# Patient Record
Sex: MALE | Race: WHITE | NOT HISPANIC OR LATINO
[De-identification: names, ages, dates, MRNs, and addresses within clinical notes are randomized per-mention and may not be internally consistent; named-entity substitution may affect disease eponyms.]

---

## 2017-05-05 ENCOUNTER — NON-APPOINTMENT (OUTPATIENT)
Age: 62
End: 2017-05-05

## 2017-05-05 ENCOUNTER — APPOINTMENT (OUTPATIENT)
Dept: CARDIOLOGY | Facility: CLINIC | Age: 62
End: 2017-05-05

## 2017-05-05 VITALS
BODY MASS INDEX: 31.08 KG/M2 | HEIGHT: 71 IN | OXYGEN SATURATION: 98 % | DIASTOLIC BLOOD PRESSURE: 70 MMHG | SYSTOLIC BLOOD PRESSURE: 120 MMHG | HEART RATE: 88 BPM | WEIGHT: 222 LBS

## 2017-05-11 ENCOUNTER — RX RENEWAL (OUTPATIENT)
Age: 62
End: 2017-05-11

## 2017-08-07 ENCOUNTER — RX RENEWAL (OUTPATIENT)
Age: 62
End: 2017-08-07

## 2017-08-10 ENCOUNTER — MEDICATION RENEWAL (OUTPATIENT)
Age: 62
End: 2017-08-10

## 2017-08-22 ENCOUNTER — RX RENEWAL (OUTPATIENT)
Age: 62
End: 2017-08-22

## 2017-08-29 ENCOUNTER — RESULT REVIEW (OUTPATIENT)
Age: 62
End: 2017-08-29

## 2017-09-05 ENCOUNTER — RX RENEWAL (OUTPATIENT)
Age: 62
End: 2017-09-05

## 2017-11-14 ENCOUNTER — NON-APPOINTMENT (OUTPATIENT)
Age: 62
End: 2017-11-14

## 2017-11-14 ENCOUNTER — APPOINTMENT (OUTPATIENT)
Dept: CARDIOLOGY | Facility: CLINIC | Age: 62
End: 2017-11-14
Payer: MEDICARE

## 2017-11-14 VITALS
WEIGHT: 229 LBS | DIASTOLIC BLOOD PRESSURE: 70 MMHG | HEART RATE: 62 BPM | SYSTOLIC BLOOD PRESSURE: 122 MMHG | OXYGEN SATURATION: 95 % | BODY MASS INDEX: 31.94 KG/M2

## 2017-11-14 DIAGNOSIS — R60.0 LOCALIZED EDEMA: ICD-10-CM

## 2017-11-14 DIAGNOSIS — M25.562 PAIN IN RIGHT KNEE: ICD-10-CM

## 2017-11-14 DIAGNOSIS — Z23 ENCOUNTER FOR IMMUNIZATION: ICD-10-CM

## 2017-11-14 DIAGNOSIS — M25.561 PAIN IN RIGHT KNEE: ICD-10-CM

## 2017-11-14 PROCEDURE — 93000 ELECTROCARDIOGRAM COMPLETE: CPT

## 2017-11-14 PROCEDURE — 99215 OFFICE O/P EST HI 40 MIN: CPT

## 2017-11-14 RX ORDER — LEVOFLOXACIN 500 MG/1
500 TABLET, FILM COATED ORAL
Qty: 7 | Refills: 0 | Status: COMPLETED | COMMUNITY
Start: 2017-04-03

## 2017-11-14 RX ORDER — AZITHROMYCIN 250 MG/1
250 TABLET, FILM COATED ORAL
Qty: 6 | Refills: 0 | Status: COMPLETED | COMMUNITY
Start: 2017-04-27

## 2017-12-08 ENCOUNTER — APPOINTMENT (OUTPATIENT)
Dept: CARDIOLOGY | Facility: CLINIC | Age: 62
End: 2017-12-08
Payer: MEDICARE

## 2017-12-08 PROCEDURE — 93306 TTE W/DOPPLER COMPLETE: CPT

## 2017-12-14 ENCOUNTER — RESULT REVIEW (OUTPATIENT)
Age: 62
End: 2017-12-14

## 2018-02-05 ENCOUNTER — RX RENEWAL (OUTPATIENT)
Age: 63
End: 2018-02-05

## 2018-02-14 ENCOUNTER — RX RENEWAL (OUTPATIENT)
Age: 63
End: 2018-02-14

## 2018-04-18 ENCOUNTER — MEDICATION RENEWAL (OUTPATIENT)
Age: 63
End: 2018-04-18

## 2018-05-03 ENCOUNTER — RX RENEWAL (OUTPATIENT)
Age: 63
End: 2018-05-03

## 2018-05-07 ENCOUNTER — RX RENEWAL (OUTPATIENT)
Age: 63
End: 2018-05-07

## 2018-05-11 ENCOUNTER — NON-APPOINTMENT (OUTPATIENT)
Age: 63
End: 2018-05-11

## 2018-05-11 ENCOUNTER — APPOINTMENT (OUTPATIENT)
Dept: CARDIOLOGY | Facility: CLINIC | Age: 63
End: 2018-05-11
Payer: MEDICARE

## 2018-05-11 VITALS
SYSTOLIC BLOOD PRESSURE: 125 MMHG | OXYGEN SATURATION: 98 % | DIASTOLIC BLOOD PRESSURE: 80 MMHG | BODY MASS INDEX: 31.5 KG/M2 | HEIGHT: 71 IN | HEART RATE: 81 BPM | WEIGHT: 225 LBS

## 2018-05-11 DIAGNOSIS — M54.41 LUMBAGO WITH SCIATICA, RIGHT SIDE: ICD-10-CM

## 2018-05-11 PROCEDURE — 99215 OFFICE O/P EST HI 40 MIN: CPT

## 2018-05-11 PROCEDURE — 93000 ELECTROCARDIOGRAM COMPLETE: CPT

## 2018-05-24 ENCOUNTER — RX RENEWAL (OUTPATIENT)
Age: 63
End: 2018-05-24

## 2018-05-31 ENCOUNTER — RX RENEWAL (OUTPATIENT)
Age: 63
End: 2018-05-31

## 2018-06-04 ENCOUNTER — RX RENEWAL (OUTPATIENT)
Age: 63
End: 2018-06-04

## 2018-06-11 ENCOUNTER — RX RENEWAL (OUTPATIENT)
Age: 63
End: 2018-06-11

## 2018-09-17 ENCOUNTER — RX RENEWAL (OUTPATIENT)
Age: 63
End: 2018-09-17

## 2018-10-25 ENCOUNTER — RX RENEWAL (OUTPATIENT)
Age: 63
End: 2018-10-25

## 2018-12-07 ENCOUNTER — APPOINTMENT (OUTPATIENT)
Dept: CARDIOLOGY | Facility: CLINIC | Age: 63
End: 2018-12-07
Payer: MEDICARE

## 2018-12-07 VITALS
HEART RATE: 66 BPM | OXYGEN SATURATION: 98 % | HEIGHT: 71 IN | WEIGHT: 229 LBS | DIASTOLIC BLOOD PRESSURE: 70 MMHG | BODY MASS INDEX: 32.06 KG/M2 | SYSTOLIC BLOOD PRESSURE: 158 MMHG

## 2018-12-07 DIAGNOSIS — R06.09 OTHER FORMS OF DYSPNEA: ICD-10-CM

## 2018-12-07 PROCEDURE — 99215 OFFICE O/P EST HI 40 MIN: CPT

## 2018-12-07 PROCEDURE — 93000 ELECTROCARDIOGRAM COMPLETE: CPT

## 2018-12-07 PROCEDURE — 93306 TTE W/DOPPLER COMPLETE: CPT

## 2018-12-07 RX ORDER — PREDNISONE 20 MG/1
20 TABLET ORAL
Qty: 28 | Refills: 0 | Status: DISCONTINUED | COMMUNITY
Start: 2018-05-08 | End: 2018-12-07

## 2018-12-11 ENCOUNTER — NON-APPOINTMENT (OUTPATIENT)
Age: 63
End: 2018-12-11

## 2018-12-11 NOTE — REVIEW OF SYSTEMS
[see HPI] : see HPI [Cough] : cough [Wheezing] : wheezing [Coughing Up Blood] : no hemoptysis [Wheezing Worse w/ Exercise] : wheezing worsens with exercise [Wheezing Worse During Cold Weather] : wheezing worse during cold weather [Negative] : Heme/Lymph

## 2018-12-11 NOTE — PHYSICAL EXAM
[General Appearance - Well Developed] : well developed [Normal Appearance] : normal appearance [Well Groomed] : well groomed [General Appearance - Well Nourished] : well nourished [No Deformities] : no deformities [General Appearance - In No Acute Distress] : no acute distress [Normal Conjunctiva] : the conjunctiva exhibited no abnormalities [Eyelids - No Xanthelasma] : the eyelids demonstrated no xanthelasmas [Normal Oral Mucosa] : normal oral mucosa [No Oral Pallor] : no oral pallor [No Oral Cyanosis] : no oral cyanosis [Normal Jugular Venous A Waves Present] : normal jugular venous A waves present [Normal Jugular Venous V Waves Present] : normal jugular venous V waves present [No Jugular Venous Albrecht A Waves] : no jugular venous albrecht A waves [Abdomen Soft] : soft [Abdomen Tenderness] : non-tender [Abdomen Mass (___ Cm)] : no abdominal mass palpated [Abnormal Walk] : normal gait [Gait - Sufficient For Exercise Testing] : the gait was sufficient for exercise testing [Nail Clubbing] : no clubbing of the fingernails [Cyanosis, Localized] : no localized cyanosis [Petechial Hemorrhages (___cm)] : no petechial hemorrhages [Skin Color & Pigmentation] : normal skin color and pigmentation [] : no rash [No Venous Stasis] : no venous stasis [Skin Lesions] : no skin lesions [No Skin Ulcers] : no skin ulcer [No Xanthoma] : no  xanthoma was observed [Oriented To Time, Place, And Person] : oriented to person, place, and time [Affect] : the affect was normal [Mood] : the mood was normal [No Anxiety] : not feeling anxious [5th Left ICS - MCL] : palpated at the 5th LICS in the midclavicular line [Not Palpable] : not palpable [No Precordial Heave] : no precordial heave was noted [Rhythm Regular] : regular [Normal S1] : abnormal S1 [Normal S2] : normal S2 [No Murmur] : no murmurs heard [Right Carotid Bruit] : no bruit heard over the right carotid [Right Femoral Bruit] : no bruit heard over the right femoral artery [2+] : left 2+ [___ +] : bilateral [unfilled]U+ pretibial pitting edema [Rt] : no varicose veins of the right leg [Lt] : no varicose veins of the left leg [Normal Rate] : the respiratory rate was normal [Acc Muscles Use] : accessory muscle use was noted [Distress] : no respiratory distress [Dry Cough] : a dry cough was heard [Audible Wheezing] : audible wheezing was heard [Scattered Wheezes] : scattered wheezing was heard [Prolonged Exp Time] : expiratory time was prolonged [Normal to Percussion] : the lungs were normal to percussion

## 2018-12-11 NOTE — CARDIOLOGY SUMMARY
[No Ischemia] : no Ischemia [No Exercise Ind Arr] : no exercise induced arrhythmias [No Symptoms] : no Symptoms [___] : [unfilled]

## 2018-12-24 ENCOUNTER — RX RENEWAL (OUTPATIENT)
Age: 63
End: 2018-12-24

## 2019-01-14 ENCOUNTER — RESULT REVIEW (OUTPATIENT)
Age: 64
End: 2019-01-14

## 2019-01-21 ENCOUNTER — RX RENEWAL (OUTPATIENT)
Age: 64
End: 2019-01-21

## 2019-03-04 ENCOUNTER — RX RENEWAL (OUTPATIENT)
Age: 64
End: 2019-03-04

## 2019-03-11 ENCOUNTER — RX RENEWAL (OUTPATIENT)
Age: 64
End: 2019-03-11

## 2019-03-12 ENCOUNTER — MEDICATION RENEWAL (OUTPATIENT)
Age: 64
End: 2019-03-12

## 2019-03-26 ENCOUNTER — APPOINTMENT (OUTPATIENT)
Dept: CARDIOLOGY | Facility: CLINIC | Age: 64
End: 2019-03-26
Payer: MEDICARE

## 2019-03-26 ENCOUNTER — NON-APPOINTMENT (OUTPATIENT)
Age: 64
End: 2019-03-26

## 2019-03-26 VITALS
BODY MASS INDEX: 32.2 KG/M2 | SYSTOLIC BLOOD PRESSURE: 120 MMHG | HEART RATE: 63 BPM | DIASTOLIC BLOOD PRESSURE: 80 MMHG | HEIGHT: 71 IN | OXYGEN SATURATION: 98 % | WEIGHT: 230 LBS

## 2019-03-26 DIAGNOSIS — I27.20 PULMONARY HYPERTENSION, UNSPECIFIED: ICD-10-CM

## 2019-03-26 DIAGNOSIS — E11.9 TYPE 2 DIABETES MELLITUS W/OUT COMPLICATIONS: ICD-10-CM

## 2019-03-26 DIAGNOSIS — I10 ESSENTIAL (PRIMARY) HYPERTENSION: ICD-10-CM

## 2019-03-26 DIAGNOSIS — E78.5 HYPERLIPIDEMIA, UNSPECIFIED: ICD-10-CM

## 2019-03-26 DIAGNOSIS — J43.9 EMPHYSEMA, UNSPECIFIED: ICD-10-CM

## 2019-03-26 PROCEDURE — 99215 OFFICE O/P EST HI 40 MIN: CPT

## 2019-03-26 PROCEDURE — 93000 ELECTROCARDIOGRAM COMPLETE: CPT

## 2019-03-26 NOTE — DISCUSSION/SUMMARY
[Possible Cardiac Ischemia (Intermd Prob)] : possible cardiac ischemia (intermediate probability) [Nuclear Imaging] : nuclear imaging [Coronary Artery Disease] : coronary artery disease [Hyperlipidemia] : hyperlipidemia [Hypertension] : hypertension [COPD] : chronic obstructive pulmonary disease [Stable] : stable [None] : none [Patient] : the patient [___ Month(s)] : [unfilled] month(s) [FreeTextEntry1] : Stable from a cardiovascular standpoint. Once again had a lengthy discussion regarding the absolute need to stop smoking ; patient is unwilling to consider this. Patient remains noncompliant with dietary restrictions + occasional alcohol ingestion.\par Planning on moving down to [place], considering ENT treatment prior to moving; may need another appointment for cardiac clearance.

## 2019-03-26 NOTE — HISTORY OF PRESENT ILLNESS
[FreeTextEntry1] : 63-year-old male with known atherosclerotic heart disease, status post CABG in 2003, poorly controlled diabetic, hypertensive, hyperlipidemic, history of COPD (still smoking ), history of GERD..  \par \par +Recurrent bouts of bronchitis treated with steroids and inhalers. He  denies any recent episodes of chest pain, shortness of breath, palpitations, diaphoresis, nausea, syncopal or near syncopal events. c/o neuropathic pains in  both feet.\par \par Also, recent CAT scan of chest demonstrate significant coronary calcifications. Patient is having occasional/chronic chest discomfort. The pain is worsened when he is dyspneic and wheezing.\par \par LBP with radiation to RLE,s/p epidural.

## 2019-04-16 ENCOUNTER — RX RENEWAL (OUTPATIENT)
Age: 64
End: 2019-04-16

## 2019-06-17 ENCOUNTER — RX CHANGE (OUTPATIENT)
Age: 64
End: 2019-06-17

## 2019-06-17 RX ORDER — CELECOXIB 200 MG/1
200 CAPSULE ORAL
Qty: 180 | Refills: 2 | Status: ACTIVE | COMMUNITY
Start: 2019-06-17 | End: 1900-01-01

## 2019-06-17 RX ORDER — CELECOXIB 200 MG/1
200 CAPSULE ORAL
Qty: 60 | Refills: 3 | Status: DISCONTINUED | COMMUNITY
Start: 2017-11-14 | End: 2019-06-17

## 2019-07-16 ENCOUNTER — RX RENEWAL (OUTPATIENT)
Age: 64
End: 2019-07-16

## 2019-07-16 RX ORDER — RANOLAZINE 500 MG/1
500 TABLET, EXTENDED RELEASE ORAL
Qty: 180 | Refills: 2 | Status: ACTIVE | COMMUNITY
Start: 2019-07-16 | End: 1900-01-01

## 2019-08-16 ENCOUNTER — RX RENEWAL (OUTPATIENT)
Age: 64
End: 2019-08-16

## 2019-12-09 ENCOUNTER — RX RENEWAL (OUTPATIENT)
Age: 64
End: 2019-12-09

## 2019-12-10 ENCOUNTER — RX RENEWAL (OUTPATIENT)
Age: 64
End: 2019-12-10

## 2020-01-28 ENCOUNTER — RX RENEWAL (OUTPATIENT)
Age: 65
End: 2020-01-28

## 2020-04-01 ENCOUNTER — RX RENEWAL (OUTPATIENT)
Age: 65
End: 2020-04-01

## 2020-04-09 ENCOUNTER — RX RENEWAL (OUTPATIENT)
Age: 65
End: 2020-04-09

## 2020-04-13 ENCOUNTER — RX RENEWAL (OUTPATIENT)
Age: 65
End: 2020-04-13

## 2020-04-27 ENCOUNTER — RX RENEWAL (OUTPATIENT)
Age: 65
End: 2020-04-27

## 2020-04-28 RX ORDER — FUROSEMIDE 40 MG/1
40 TABLET ORAL
Qty: 30 | Refills: 0 | Status: ACTIVE | COMMUNITY
Start: 2017-08-10 | End: 1900-01-01

## 2020-05-13 ENCOUNTER — RX RENEWAL (OUTPATIENT)
Age: 65
End: 2020-05-13

## 2020-06-01 ENCOUNTER — RX RENEWAL (OUTPATIENT)
Age: 65
End: 2020-06-01

## 2020-06-03 ENCOUNTER — RX RENEWAL (OUTPATIENT)
Age: 65
End: 2020-06-03

## 2020-06-03 RX ORDER — SITAGLIPTIN AND METFORMIN HYDROCHLORIDE 50; 1000 MG/1; MG/1
50-1000 TABLET, FILM COATED ORAL
Qty: 90 | Refills: 0 | Status: ACTIVE | COMMUNITY
Start: 2017-05-05 | End: 1900-01-01

## 2020-06-08 ENCOUNTER — RX RENEWAL (OUTPATIENT)
Age: 65
End: 2020-06-08

## 2020-11-02 ENCOUNTER — RX RENEWAL (OUTPATIENT)
Age: 65
End: 2020-11-02

## 2020-11-10 ENCOUNTER — RX RENEWAL (OUTPATIENT)
Age: 65
End: 2020-11-10

## 2022-09-07 NOTE — DISCUSSION/SUMMARY
[Possible Cardiac Ischemia (Intermd Prob)] : possible cardiac ischemia (intermediate probability) [Nuclear Imaging] : nuclear imaging [Coronary Artery Disease] : coronary artery disease [Hyperlipidemia] : hyperlipidemia [Hypertension] : hypertension [COPD] : chronic obstructive pulmonary disease [Stable] : stable [None] : none [Patient] : the patient [___ Month(s)] : [unfilled] month(s) [FreeTextEntry1] : Stable from a cardiovascular standpoint. Once again had a lengthy discussion regarding the absolute need to stop smoking ; patient is unwilling to consider this. Patient remains noncompliant with dietary restrictions + occasional alcohol ingestion. No recent labs available, will request labs - cannot refill Rx without labs..  [phone]